# Patient Record
Sex: FEMALE | URBAN - METROPOLITAN AREA
[De-identification: names, ages, dates, MRNs, and addresses within clinical notes are randomized per-mention and may not be internally consistent; named-entity substitution may affect disease eponyms.]

---

## 2022-04-11 ENCOUNTER — LAB OUTSIDE AN ENCOUNTER (OUTPATIENT)
Dept: URBAN - METROPOLITAN AREA CLINIC 23 | Facility: CLINIC | Age: 52
End: 2022-04-11

## 2022-04-11 ENCOUNTER — OFFICE VISIT (OUTPATIENT)
Dept: URBAN - METROPOLITAN AREA CLINIC 23 | Facility: CLINIC | Age: 52
End: 2022-04-11
Payer: COMMERCIAL

## 2022-04-11 ENCOUNTER — WEB ENCOUNTER (OUTPATIENT)
Dept: URBAN - METROPOLITAN AREA CLINIC 23 | Facility: CLINIC | Age: 52
End: 2022-04-11

## 2022-04-11 DIAGNOSIS — K62.5 RECTAL BLEEDING: ICD-10-CM

## 2022-04-11 DIAGNOSIS — K21.9 GASTROESOPHAGEAL REFLUX DISEASE, UNSPECIFIED WHETHER ESOPHAGITIS PRESENT: ICD-10-CM

## 2022-04-11 DIAGNOSIS — E66.9 OBESITY (BMI 30.0-34.9): ICD-10-CM

## 2022-04-11 PROBLEM — 443371000124107: Status: ACTIVE | Noted: 2022-04-11

## 2022-04-11 PROCEDURE — 99204 OFFICE O/P NEW MOD 45 MIN: CPT | Performed by: STUDENT IN AN ORGANIZED HEALTH CARE EDUCATION/TRAINING PROGRAM

## 2022-04-11 RX ORDER — PANTOPRAZOLE SODIUM 40 MG/1
1 TABLET TABLET, DELAYED RELEASE ORAL ONCE A DAY
Qty: 30 | Refills: 3 | OUTPATIENT
Start: 2022-04-11

## 2022-04-11 NOTE — HPI-TODAY'S VISIT:
50 yo F here for evaluation after ER visit.  Last week she says that she had lower abdominal cramping, gassiness, diarrhea. She says that she had bright red blood in her stool. Sx started on Thursday and occured with acute onset. SHe went to [facility] ER. says they took blood, gave her medication for cramps and discharged her. DId not get any imaging done. Denies being given antibiotics on discharge. She was recommended to have GI follow up.  She takes Meloxicam "for inflammation all over her body" No prior EGD or colonoscopy. No FH of malignancy.  She says that her GI Sx have occured on and off for her entire life.  Complains of having acid reflux. Does not take a medication for acid. Says she takes zofran as needed for nausea.  Complains of bloating and queasiness that is constant.  Denies dysphagia or odynophagia.  Reports gaining in 30 lbs in the past

## 2022-04-12 ENCOUNTER — TELEPHONE ENCOUNTER (OUTPATIENT)
Dept: URBAN - METROPOLITAN AREA CLINIC 92 | Facility: CLINIC | Age: 52
End: 2022-04-12

## 2022-04-12 RX ORDER — OMEPRAZOLE 40 MG/1
1 CAPSULE 30 MINUTES BEFORE MORNING MEAL CAPSULE, DELAYED RELEASE ORAL ONCE A DAY
Qty: 90 | Refills: 3 | OUTPATIENT
Start: 2022-04-12

## 2022-04-12 RX ORDER — PANTOPRAZOLE SODIUM 40 MG/1
1 TABLET TABLET, DELAYED RELEASE ORAL ONCE A DAY
Qty: 30 | Refills: 3 | Status: ACTIVE | COMMUNITY
Start: 2022-04-11

## 2022-04-14 PROBLEM — 235595009: Status: ACTIVE | Noted: 2022-04-11

## 2022-04-26 ENCOUNTER — OFFICE VISIT (OUTPATIENT)
Dept: URBAN - METROPOLITAN AREA LAB 3 | Facility: LAB | Age: 52
End: 2022-04-26
Payer: COMMERCIAL

## 2022-04-26 DIAGNOSIS — B96.81 BACTERIAL INFECTION DUE TO H. PYLORI: ICD-10-CM

## 2022-04-26 DIAGNOSIS — K62.5 ANAL BLEEDING: ICD-10-CM

## 2022-04-26 DIAGNOSIS — K29.60 ADENOPAPILLOMATOSIS GASTRICA: ICD-10-CM

## 2022-04-26 PROCEDURE — 45378 DIAGNOSTIC COLONOSCOPY: CPT | Performed by: STUDENT IN AN ORGANIZED HEALTH CARE EDUCATION/TRAINING PROGRAM

## 2022-04-26 PROCEDURE — 43239 EGD BIOPSY SINGLE/MULTIPLE: CPT | Performed by: STUDENT IN AN ORGANIZED HEALTH CARE EDUCATION/TRAINING PROGRAM

## 2022-04-26 RX ORDER — OMEPRAZOLE 40 MG/1
1 CAPSULE 30 MINUTES BEFORE MORNING MEAL CAPSULE, DELAYED RELEASE ORAL ONCE A DAY
Qty: 90 | Refills: 3 | Status: ACTIVE | COMMUNITY
Start: 2022-04-12

## 2022-04-26 RX ORDER — PANTOPRAZOLE SODIUM 40 MG/1
1 TABLET TABLET, DELAYED RELEASE ORAL ONCE A DAY
Qty: 30 | Refills: 3 | Status: ACTIVE | COMMUNITY
Start: 2022-04-11

## 2022-05-08 ENCOUNTER — TELEPHONE ENCOUNTER (OUTPATIENT)
Dept: URBAN - METROPOLITAN AREA CLINIC 92 | Facility: CLINIC | Age: 52
End: 2022-05-08

## 2022-05-08 RX ORDER — OMEPRAZOLE 40 MG/1
1 CAPSULE 30 MINUTES BEFORE MORNING MEAL CAPSULE, DELAYED RELEASE ORAL ONCE A DAY
Qty: 90 | Refills: 3 | Status: ACTIVE | COMMUNITY
Start: 2022-04-12

## 2022-05-08 RX ORDER — CLARITHROMYCIN 500 MG/1
1 TABLET TABLET, FILM COATED ORAL
Qty: 28 TABLET | Refills: 0 | OUTPATIENT
Start: 2022-05-08 | End: 2022-05-22

## 2022-05-08 RX ORDER — AMOXICILLIN 500 MG/1
2 CAPSULES CAPSULE ORAL
Qty: 56 CAPSULE | Refills: 0 | OUTPATIENT
Start: 2022-05-08 | End: 2022-05-22

## 2022-05-08 RX ORDER — OMEPRAZOLE 40 MG/1
AS DIRECTED CAPSULE, DELAYED RELEASE ORAL TWICE DAILY
Qty: 28 CAPSULE | Refills: 0 | OUTPATIENT
Start: 2022-05-08

## 2022-05-08 RX ORDER — PANTOPRAZOLE SODIUM 40 MG/1
1 TABLET TABLET, DELAYED RELEASE ORAL ONCE A DAY
Qty: 30 | Refills: 3 | Status: ACTIVE | COMMUNITY
Start: 2022-04-11

## 2022-11-10 ENCOUNTER — OFFICE VISIT (OUTPATIENT)
Dept: URBAN - METROPOLITAN AREA CLINIC 23 | Facility: CLINIC | Age: 52
End: 2022-11-10
Payer: COMMERCIAL

## 2022-11-10 VITALS
BODY MASS INDEX: 29.52 KG/M2 | HEART RATE: 87 BPM | TEMPERATURE: 96.8 F | HEIGHT: 63 IN | DIASTOLIC BLOOD PRESSURE: 75 MMHG | WEIGHT: 166.6 LBS | SYSTOLIC BLOOD PRESSURE: 119 MMHG

## 2022-11-10 DIAGNOSIS — R14.0 BLOATING: ICD-10-CM

## 2022-11-10 DIAGNOSIS — R10.13 DYSPEPSIA: ICD-10-CM

## 2022-11-10 DIAGNOSIS — A04.8 H. PYLORI INFECTION: ICD-10-CM

## 2022-11-10 PROCEDURE — 99213 OFFICE O/P EST LOW 20 MIN: CPT | Performed by: STUDENT IN AN ORGANIZED HEALTH CARE EDUCATION/TRAINING PROGRAM

## 2022-11-10 RX ORDER — OMEPRAZOLE 40 MG/1
1 CAPSULE 30 MINUTES BEFORE MORNING MEAL CAPSULE, DELAYED RELEASE ORAL ONCE A DAY
Qty: 90 | Refills: 3 | Status: ON HOLD | COMMUNITY
Start: 2022-04-12

## 2022-11-10 RX ORDER — PANTOPRAZOLE SODIUM 40 MG/1
1 TABLET TABLET, DELAYED RELEASE ORAL ONCE A DAY
Qty: 30 | Refills: 3 | Status: ON HOLD | COMMUNITY
Start: 2022-04-11

## 2022-11-10 RX ORDER — OMEPRAZOLE 40 MG/1
AS DIRECTED CAPSULE, DELAYED RELEASE ORAL TWICE DAILY
Qty: 28 CAPSULE | Refills: 0 | Status: ON HOLD | COMMUNITY
Start: 2022-05-08

## 2022-11-10 NOTE — HPI-TODAY'S VISIT:
51 yo F here for follow up. Sx of abd cramping, gassiness. No longer having diarrhea. EGD normal grossly, positive for H pylori. Completed antibiotics a few months ago. Felt well until 2 months ago.  Now she feels bloating and stomach gurgling. She does take Meloxicam occasionally. Not on PPI.   Initial Appx: Last week she says that she had lower abdominal cramping, gassiness, diarrhea. She says that she had bright red blood in her stool. Sx started on Thursday and occured with acute onset. SHe went to [facility] ER. says they took blood, gave her medication for cramps and discharged her. DId not get any imaging done. Denies being given antibiotics on discharge. She was recommended to have GI follow up.  She takes Meloxicam "for inflammation all over her body" No prior EGD or colonoscopy. No FH of malignancy.  She says that her GI Sx have occured on and off for her entire life.  Complains of having acid reflux. Does not take a medication for acid. Says she takes zofran as needed for nausea.  Complains of bloating and queasiness that is constant.  Denies dysphagia or odynophagia.  Reports gaining in 30 lbs in the past

## 2022-11-15 LAB
H PYLORI BREATH TEST: NOT DETECTED
H. PYLORI BREATH TEST: NOT DETECTED
INTERPRETATION: NOT DETECTED

## 2022-11-18 ENCOUNTER — TELEPHONE ENCOUNTER (OUTPATIENT)
Dept: URBAN - METROPOLITAN AREA CLINIC 12 | Facility: CLINIC | Age: 52
End: 2022-11-18

## 2023-06-05 ENCOUNTER — DASHBOARD ENCOUNTERS (OUTPATIENT)
Age: 53
End: 2023-06-05

## 2023-06-05 ENCOUNTER — OFFICE VISIT (OUTPATIENT)
Dept: URBAN - METROPOLITAN AREA CLINIC 23 | Facility: CLINIC | Age: 53
End: 2023-06-05
Payer: COMMERCIAL

## 2023-06-05 ENCOUNTER — LAB OUTSIDE AN ENCOUNTER (OUTPATIENT)
Dept: URBAN - METROPOLITAN AREA CLINIC 23 | Facility: CLINIC | Age: 53
End: 2023-06-05

## 2023-06-05 VITALS
DIASTOLIC BLOOD PRESSURE: 74 MMHG | BODY MASS INDEX: 30.05 KG/M2 | SYSTOLIC BLOOD PRESSURE: 116 MMHG | HEIGHT: 63 IN | WEIGHT: 169.6 LBS | HEART RATE: 89 BPM | TEMPERATURE: 97.4 F

## 2023-06-05 DIAGNOSIS — Z79.1 NSAID LONG-TERM USE: ICD-10-CM

## 2023-06-05 DIAGNOSIS — R14.0 ABDOMINAL BLOATING: ICD-10-CM

## 2023-06-05 DIAGNOSIS — K59.09 CHRONIC CONSTIPATION: ICD-10-CM

## 2023-06-05 PROCEDURE — 99214 OFFICE O/P EST MOD 30 MIN: CPT | Performed by: INTERNAL MEDICINE

## 2023-06-05 NOTE — HPI-TODAY'S VISIT:
52-year-old female presents for 1 year history of bloating, rumbling. She had EGD and colonoscopy done with Dr. Mejia.  H. pylori infection noted.  Treated and cleared.  She did not feel any difference before and after treatment.  And her symptoms still persistent. She has chronic constipation, moves bowel every other day, harder stools. Currently she is on fiber supplements, 4-5 capsules daily, she moves bowel once or twice a day, denies straining. Bloating can get worse with eating vegetable such as broccoli. Denies abdominal pain. Has gained weight to 10 pounds. She takes meloxicam once daily. Tried PPI, no improvement.

## 2023-06-05 NOTE — PREVIOUS WORKUP REVIEWED
. ENDOSCOPIES -EGD 4/26/2022: Normal EGD. -Colonoscopy 4/26/2022: Normal colon. *Pathology: Stomach-chronic active gastritis, numerous Helicobacter pylori.  LABS -Labs 3/30/2023: WBC 9.9, hemoglobin 13.3, platelet 271, hemoglobin A1c 5.5, BUN 17, creatinine 0.7, total protein 6.7, albumin 4.3, total bilirubin 0.4, alkaline phosphatase 47, AST 12, ALT 13, TSH 4.3. -Labs 11/10/2022: H. pylori breath test negative.  IMAGES

## 2023-06-06 LAB
IMMUNOGLOBULIN A, QN, SERUM: 103
T-TRANSGLUTAMINASE (TTG) IGA: <1

## 2023-06-08 ENCOUNTER — TELEPHONE ENCOUNTER (OUTPATIENT)
Dept: URBAN - METROPOLITAN AREA CLINIC 92 | Facility: CLINIC | Age: 53
End: 2023-06-08

## 2023-06-08 RX ORDER — PREDNISONE 50 MG/1
1 TABLET TABLET ORAL
Qty: 3 | Refills: 0 | OUTPATIENT
Start: 2023-06-08 | End: 2023-06-09

## 2023-06-23 ENCOUNTER — LAB OUTSIDE AN ENCOUNTER (OUTPATIENT)
Dept: URBAN - METROPOLITAN AREA CLINIC 78 | Facility: CLINIC | Age: 53
End: 2023-06-23

## 2023-06-27 ENCOUNTER — TELEPHONE ENCOUNTER (OUTPATIENT)
Dept: URBAN - METROPOLITAN AREA CLINIC 6 | Facility: CLINIC | Age: 53
End: 2023-06-27

## 2023-06-29 ENCOUNTER — LAB OUTSIDE AN ENCOUNTER (OUTPATIENT)
Dept: URBAN - METROPOLITAN AREA CLINIC 6 | Facility: CLINIC | Age: 53
End: 2023-06-29

## 2023-07-31 ENCOUNTER — LAB OUTSIDE AN ENCOUNTER (OUTPATIENT)
Dept: URBAN - METROPOLITAN AREA CLINIC 78 | Facility: CLINIC | Age: 53
End: 2023-07-31

## 2023-08-04 ENCOUNTER — TELEPHONE ENCOUNTER (OUTPATIENT)
Dept: URBAN - METROPOLITAN AREA CLINIC 78 | Facility: CLINIC | Age: 53
End: 2023-08-04

## 2023-08-04 RX ORDER — METOCLOPRAMIDE HYDROCHLORIDE 10 MG/1
1 TAB TABLET ORAL
Qty: 30 | Refills: 2 | OUTPATIENT
Start: 2023-08-04

## 2025-03-31 ENCOUNTER — OFFICE VISIT (OUTPATIENT)
Dept: URBAN - METROPOLITAN AREA CLINIC 23 | Facility: CLINIC | Age: 55
End: 2025-03-31

## 2025-03-31 PROBLEM — 235675006: Status: ACTIVE | Noted: 2025-03-31

## 2025-03-31 RX ORDER — METOCLOPRAMIDE 5 MG/1
1 TABLET BEFORE MEALS TABLET ORAL TWICE A DAY
Qty: 60 | Refills: 3 | OUTPATIENT
Start: 2025-03-31

## 2025-03-31 RX ORDER — POLYETHYLENE GLYCOL 3350 17 G/17G
1 SCOOP MIXED WITH 8 OUNCES OF FLUID POWDER, FOR SOLUTION ORAL ONCE A DAY
Qty: 510 GRAM | Refills: 3 | OUTPATIENT
Start: 2025-03-31 | End: 2025-07-29

## 2025-03-31 RX ORDER — PANTOPRAZOLE SODIUM 40 MG/1
1 TABLET, 30 MINS BEFORE A MEAL TABLET, DELAYED RELEASE ORAL ONCE A DAY
Qty: 90 TABLET | Refills: 3 | OUTPATIENT
Start: 2025-03-31

## 2025-03-31 NOTE — PREVIOUS WORKUP REVIEWED EXTERNAL MEDICAL RECORD
. ENDOSCOPIES -EGD 4/26/2022: Normal EGD. -Colonoscopy 4/26/2022: Normal colon. *Pathology: Stomach-chronic active gastritis, numerous Helicobacter pylori.  LABS -Labs 6/5/2023: IgA 103, TTG IgA < 1.0. -Labs 3/30/2023: WBC 9.9, hemoglobin 13.3, platelet 271, hemoglobin A1c 5.5, BUN 17, creatinine 0.7, total protein 6.7, albumin 4.3, total bilirubin 0.4, alkaline phosphatase 47, AST 12, ALT 13, TSH 4.3. -Labs 11/10/2022: H. pylori breath test negative.  IMAGES -Gastric emptying study 7/31/2023: Retention at 60 minutes is 88%, 100 minutes 70% (normal < 45%), 240 minutes of 50% (normal < 10%).-CT enterography 6/23/2023: Fatty liver change, status postcholecystectomy.  Normal bile ducts.  Normal pancreas and spleen.  Normal GI tract.

## 2025-03-31 NOTE — HPI-TODAY'S VISIT:
54-year-old female with IBS-C, gastroparesis presents for abdominal bloating, burping.  It has gotten worse few months.  Denies bloody stool or melena, unintentional weight loss.  Denies vomiting. Constipated, moves bowel every 2-3 days, [place] Stool Scale type I-II.  She takes cleans more over-the-counter (Aloe and magnesium), which helps. Diagnosed with gastroparesis 2022, Reglan was prescribed but patient does not recall taking the medication.  She did not have a follow-up after the test done. Autoimmune disorder runs in the family.